# Patient Record
Sex: MALE | Race: WHITE
[De-identification: names, ages, dates, MRNs, and addresses within clinical notes are randomized per-mention and may not be internally consistent; named-entity substitution may affect disease eponyms.]

---

## 2020-07-31 ENCOUNTER — HOSPITAL ENCOUNTER (EMERGENCY)
Dept: HOSPITAL 11 - JP.ED | Age: 45
Discharge: HOME | End: 2020-07-31
Payer: COMMERCIAL

## 2020-07-31 DIAGNOSIS — Z79.899: ICD-10-CM

## 2020-07-31 DIAGNOSIS — I10: ICD-10-CM

## 2020-07-31 DIAGNOSIS — J45.909: Primary | ICD-10-CM

## 2020-07-31 DIAGNOSIS — F17.210: ICD-10-CM

## 2020-07-31 PROCEDURE — 99283 EMERGENCY DEPT VISIT LOW MDM: CPT

## 2020-07-31 PROCEDURE — 94640 AIRWAY INHALATION TREATMENT: CPT

## 2020-07-31 NOTE — EDM.PDOC
ED HPI GENERAL MEDICAL PROBLEM





- General


Chief Complaint: Asthma


Stated Complaint: NEEDS ASTHMA INHALER


Time Seen by Provider: 07/31/20 21:01





- History of Present Illness


INITIAL COMMENTS - FREE TEXT/NARRATIVE: 





Geovany presents today with complaints of high blood pressure and needing an 

albuterol inhaler.  He reports a history of asthma and hypertension.  He is up 

in the area on vacation and has forgotten his medications.  He denies fever, 

chills, nausea, vomiting, change in bowel/bladder, headaches, dizziness, chest 

pain, palpitations or other concerns. 





He reports use of claritin D, melatonin for OTC medications. 





- Related Data


                                    Allergies











Allergy/AdvReac Type Severity Reaction Status Date / Time


 


No Known Allergies Allergy   Verified 07/31/20 21:08











Home Meds: 


                                    Home Meds





Albuterol Sulfate [Albuterol Sulfate Hfa] 2 puff INH QID PRN 07/31/20 [History]


Fluticasone/Salmeterol [Fluticasone-Salmeterol 232-14 MCG Powder Inha] 1 puff 

INH BID 07/31/20 [History]


lisinopriL [Lisinopril] 1 tab PO DAILY 07/31/20 [History]











Past Medical History


Cardiovascular History: Reports: Hypertension


Respiratory History: Reports: Asthma


Musculoskeletal History: Reports: Fracture





- Past Surgical History


Musculoskeletal Surgical History: Reports: ORIF





Social & Family History





- Tobacco Use


Smoking Status *Q: Current Some Day Smoker


Years of Tobacco use: 30


Packs/Tins Daily: 0.1





- Caffeine Use


Caffeine Use: Reports: Coffee





- Alcohol Use


Days Per Week of Alcohol Use: 1


Number of Drinks Per Day: 6


Total Drinks Per Week: 6





- Recreational Drug Use


Recreational Drug Use: No





ED ROS GENERAL





- Review of Systems


Review Of Systems: See Below


Constitutional: Reports: No Symptoms


HEENT: Reports: No Symptoms


Respiratory: Reports: No Symptoms


Cardiovascular: Reports: No Symptoms


Endocrine: Reports: No Symptoms


Musculoskeletal: Reports: No Symptoms


Skin: Reports: No Symptoms


Neurological: Reports: No Symptoms


Psychiatric: Reports: No Symptoms


Hematologic/Lymphatic: Reports: No Symptoms


Immunologic: Reports: No Symptoms





ED EXAM, GENERAL





- Physical Exam


Exam: See Below


Exam Limited By: No Limitations


General Appearance: Alert, WD/WN, No Apparent Distress


Head: Atraumatic, Normocephalic


Respiratory/Chest: No Respiratory Distress, Lungs Clear, Normal Breath Sounds, 

No Accessory Muscle Use, Chest Non-Tender, Wheezing (faint expiratory wheezes). 

 No: Crackles, Rales, Rhonchi, Stridor, Accessory Muscle Use, Retractions


Cardiovascular: Normal Peripheral Pulses, Regular Rate, Rhythm, No Edema, No 

Murmur, No Rub


Back Exam: Normal Inspection, Full Range of Motion


Extremities: Normal Inspection, Normal Range of Motion, Non-Tender, No Pedal 

Edema, Normal Capillary Refill


Neurological: Alert, Oriented, Normal Cognition, Normal Gait, No Motor/Sensory 

Deficits


Psychiatric: Normal Affect, Normal Mood


Skin Exam: Warm, Dry, Intact, Normal Color, No Rash


Lymphatic: No Adenopathy





Course





- Vital Signs


Last Recorded V/S: 


                                Last Vital Signs











Temp  37.2 C   07/31/20 21:12


 


Pulse  114 H  07/31/20 21:12


 


Resp  18   07/31/20 21:12


 


BP  162/117 H  07/31/20 21:22


 


Pulse Ox  96   07/31/20 21:12














- Orders/Labs/Meds


Orders: 


                               Active Orders 24 hr











 Category Date Time Status


 


 RT Post Treatment Assessment [RC] Click to Edit Care  07/31/20 21:16 Active


 


 Albuterol [Ventolin HFA] Med  07/31/20 22:00 Active





 1 gm INH QID   








                                Medication Orders





Albuterol (Ventolin Hfa)  1 gm INH QID JOELLE


   Last Admin: 07/31/20 21:22  Dose: 2 puff


   Documented by: JULIO CÉSAR








Meds: 


Medications











Generic Name Dose Route Start Last Admin





  Trade Name Freq  PRN Reason Stop Dose Admin


 


Albuterol  1 gm  07/31/20 22:00  07/31/20 21:22





  Ventolin Hfa  INH   2 puff





  QID JOELLE   Administration














Discontinued Medications














Generic Name Dose Route Start Last Admin





  Trade Name Freq  PRN Reason Stop Dose Admin


 


Lisinopril  20 mg  07/31/20 21:15  07/31/20 21:22





  Prinivil  PO  07/31/20 21:16  20 mg





  ONETIME ONE   Administration














- Re-Assessments/Exams


Free Text/Narrative Re-Assessment/Exam: 


recheck /95





Departure





- Departure


Time of Disposition: 21:30


Disposition: Home, Self-Care 01


Condition: Good


Clinical Impression: 


 Asthma, Hypertension








- Discharge Information


Instructions:  Hypertension, Adult, Easy-to-Read, Asthma, Adult, Easy-to-Read


Referrals: 


PCP,None [Primary Care Provider] - 


Forms:  ED Department Discharge


Additional Instructions: 


Asthma and hypertension





Lisinopril 20mg by mouth in the emergency room. 


Albuterol inhaler provided. 





Take lisinopril 10mg once a day, hard copy script provided. 





Stop use of claritin D as it can increase blood pressure (D=pseudoephedrine)


Try use of zyrtec (cetirizine) 10mg by mouth daily for seasonal allergies. 





Follow up with primary in 7 to 10 days for a blood pressure check.





If any worsening, headaches, chest pain, shortness of breath, change in vision 

or other concerns return to the emergency room. 





Sepsis Event Note (ED)





- Evaluation


Sepsis Screening Result: No Definite Risk





- Focused Exam


Vital Signs: 


                                   Vital Signs











  Temp Pulse Resp BP BP Pulse Ox


 


 07/31/20 21:22     162/117 H  


 


 07/31/20 21:12  37.2 C  114 H  18   162/117 H  96














- My Orders


Last 24 Hours: 


My Active Orders





07/31/20 21:16


RT Post Treatment Assessment [RC] Click to Edit 





07/31/20 22:00


Albuterol [Ventolin HFA]   1 gm INH QID 














- Assessment/Plan


Last 24 Hours: 


My Active Orders





07/31/20 21:16


RT Post Treatment Assessment [RC] Click to Edit 





07/31/20 22:00


Albuterol [Ventolin HFA]   1 gm INH QID 











Assessment:: 





asthma 


hypertension


Plan: 





Asthma and hypertension





Lisinopril 20mg by mouth in the emergency room. 


Albuterol inhaler provided. 





Take lisinopril 10mg once a day, hard copy script provided. 





Stop use of claritin D as it can increase blood pressure (D=pseudoephedrine)


Try use of zyrtec (cetirizine) 10mg by mouth daily for seasonal allergies. 





Follow up with primary in 7 to 10 days for a blood pressure check.





If any worsening, headaches, chest pain, shortness of breath, change in vision 

or other concerns return to the emergency room.